# Patient Record
Sex: FEMALE | Race: WHITE | NOT HISPANIC OR LATINO | Employment: UNEMPLOYED | URBAN - METROPOLITAN AREA
[De-identification: names, ages, dates, MRNs, and addresses within clinical notes are randomized per-mention and may not be internally consistent; named-entity substitution may affect disease eponyms.]

---

## 2017-05-09 RX ORDER — CITALOPRAM 20 MG/1
TABLET ORAL
Qty: 90 TABLET | Refills: 0 | Status: SHIPPED | OUTPATIENT
Start: 2017-05-09 | End: 2017-07-20 | Stop reason: SDUPTHER

## 2017-07-20 ENCOUNTER — OFFICE VISIT (OUTPATIENT)
Dept: OBSTETRICS AND GYNECOLOGY | Age: 47
End: 2017-07-20

## 2017-07-20 VITALS
HEIGHT: 66 IN | DIASTOLIC BLOOD PRESSURE: 74 MMHG | BODY MASS INDEX: 27.97 KG/M2 | SYSTOLIC BLOOD PRESSURE: 112 MMHG | WEIGHT: 174 LBS

## 2017-07-20 DIAGNOSIS — N95.1 MENOPAUSAL SYMPTOMS: ICD-10-CM

## 2017-07-20 DIAGNOSIS — Z11.51 SCREENING FOR HUMAN PAPILLOMAVIRUS: ICD-10-CM

## 2017-07-20 DIAGNOSIS — Z12.4 ROUTINE CERVICAL SMEAR: ICD-10-CM

## 2017-07-20 DIAGNOSIS — E07.9 THYROID MASS: Primary | ICD-10-CM

## 2017-07-20 DIAGNOSIS — Z01.419 ENCOUNTER FOR GYNECOLOGICAL EXAMINATION WITHOUT ABNORMAL FINDING: ICD-10-CM

## 2017-07-20 PROCEDURE — 99396 PREV VISIT EST AGE 40-64: CPT | Performed by: OBSTETRICS & GYNECOLOGY

## 2017-07-20 RX ORDER — ACETAMINOPHEN AND CODEINE PHOSPHATE 120; 12 MG/5ML; MG/5ML
1 SOLUTION ORAL DAILY
Qty: 28 TABLET | Refills: 11 | Status: SHIPPED | OUTPATIENT
Start: 2017-07-20 | End: 2017-10-11

## 2017-07-20 RX ORDER — CITALOPRAM 20 MG/1
20 TABLET ORAL DAILY
Qty: 90 TABLET | Refills: 3 | Status: SHIPPED | OUTPATIENT
Start: 2017-07-20 | End: 2018-06-07 | Stop reason: SDUPTHER

## 2017-07-20 NOTE — PROGRESS NOTES
Subjective     Chief Complaint   Patient presents with   • Gynecologic Exam     AC       History of Present Illness    Gia Roberts is a 47 y.o.  who presents for annual exam.  Patient has had a prior endometrial ablation. Her cycles have been much lighter overall but she is still having them.  During one month she had 2 cycles in a month but prior to that they were regular.  She is noting hot flashes.  She tried a regular oral contraceptive pill but had an increase in her blood pressure.  She would like to try the mini pill again Pt felt mass in neck - TSH was normal at [place] - 1.02 patient was not happy with the workup and would like to be referred to endocrinology.  Her menses are irregular, lasting 2-3 light , dysmenorrhea none   Obstetric History:  OB History      Para Term  AB TAB SAB Ectopic Multiple Living    3 3 3       3        Obstetric Comments    Also one stepchild         Menstrual History:     Patient's last menstrual period was 07/10/2017.         Current contraception: vasectomy  History of abnormal Pap smear: yes -  positive HPV in  type 16 and 18 were negative.  Received Gardasil immunization: no  Perform regular self breast exam: no  Family history of uterine or ovarian cancer: yes - Maternal aunt  with ovarian cancer in her 40s.  Patient's BRCA test is negative  Family History of colon cancer: no  Family history of breast cancer: no    Mammogram: done today.  Colonoscopy: not indicated.  DEXA: not indicated.    Exercise: 30 mintutes cardio daily  Calcium/Vitamin D: adequate intake and uses supplements    The following portions of the patient's history were reviewed and updated as appropriate: allergies, current medications, past family history, past medical history, past social history, past surgical history and problem list.    Review of Systems    Review of Systems   Constitutional: Negative for fatigue.   Respiratory: Negative for shortness of breath.   "  Gastrointestinal: Negative for abdominal pain.   Genitourinary: Negative for dysuria.   Neurological: Negative for headaches.   Psychiatric/Behavioral: Negative for dysphoric mood.         Objective   Physical Exam    /74  Ht 66\" (167.6 cm)  Wt 174 lb (78.9 kg)  LMP 07/10/2017  BMI 28.08 kg/m2    General:   alert, appears stated age and cooperative   Neck: A possible small mass in the right lobe of the thyroid.  No adenopathy is palpated.     Heart: regular rate and rhythm   Lungs: clear to auscultation bilaterally   Abdomen: soft, non-tender, without masses or organomegaly   Breast: inspection negative, no nipple discharge or bleeding, no masses or nodularity palpable   Vulva: normal, Bartholin's, Urethra, [place]'s normal   Vagina: normal mucosa, normal discharge   Cervix: no cervical motion tenderness and no lesions   Uterus: mobile, non-tender, normal shape and consistency   Adnexa: no mass, fullness, tenderness   Rectal: normal rectal, no masses     Assessment/Plan   Gia was seen today for gynecologic exam.    Diagnoses and all orders for this visit:    Thyroid mass  -     Ambulatory Referral to Endocrinology  -     CBC & Differential  -     TSH  -     T4  -     T3, Uptake  -     T3 Uptake & FTI    Encounter for gynecological examination without abnormal finding  -     IGP, Aptima HPV, Rfx 16 / 18,45    Menopausal symptoms    Routine cervical smear  -     IGP, Aptima HPV, Rfx 16 / 18,45    Screening for human papillomavirus  -     IGP, Aptima HPV, Rfx 16 / 18,45    Other orders  -     citalopram (CeleXA) 20 MG tablet; Take 1 tablet by mouth Daily.  -     norethindrone (NOR-QD) 0.35 MG tablet; Take 1 tablet by mouth Daily.  Patient will continue on Celexa.  She did try a break but had significant depression symptoms and wished to restart.  She would also like to restart the mini pill due to hot flashes and some irregularity of her cycle.  She was told to report if she has any more months with an " irregular cycle and we will consider an endometrial biopsy.    Questionable area in the right thyroid.  We will check thyroid studies a CBC and refer the patient to endocrinology.    All questions answered.  Breast self exam technique reviewed and patient encouraged to perform self-exam monthly.  Discussed healthy lifestyle modifications.  Recommended 30 minutes of aerobic exercise five times per week.  Discussed calcium needs to prevent osteoporosis.

## 2017-07-21 ENCOUNTER — TELEPHONE (OUTPATIENT)
Dept: OBSTETRICS AND GYNECOLOGY | Age: 47
End: 2017-07-21

## 2017-07-21 LAB
BASOPHILS # BLD AUTO: 0.03 10*3/MM3 (ref 0–0.2)
BASOPHILS NFR BLD AUTO: 0.5 % (ref 0–1.5)
EOSINOPHIL # BLD AUTO: 0.07 10*3/MM3 (ref 0–0.7)
EOSINOPHIL NFR BLD AUTO: 1.1 % (ref 0.3–6.2)
ERYTHROCYTE [DISTWIDTH] IN BLOOD BY AUTOMATED COUNT: 12.2 % (ref 11.7–13)
FT4I SERPL CALC-MCNC: 1.5 (ref 1.2–4.9)
HCT VFR BLD AUTO: 42.6 % (ref 35.6–45.5)
HGB BLD-MCNC: 14.4 G/DL (ref 11.9–15.5)
IMM GRANULOCYTES # BLD: 0 10*3/MM3 (ref 0–0.03)
IMM GRANULOCYTES NFR BLD: 0 % (ref 0–0.5)
LYMPHOCYTES # BLD AUTO: 1.87 10*3/MM3 (ref 0.9–4.8)
LYMPHOCYTES NFR BLD AUTO: 30.5 % (ref 19.6–45.3)
MCH RBC QN AUTO: 31.6 PG (ref 26.9–32)
MCHC RBC AUTO-ENTMCNC: 33.8 G/DL (ref 32.4–36.3)
MCV RBC AUTO: 93.6 FL (ref 80.5–98.2)
MONOCYTES # BLD AUTO: 0.4 10*3/MM3 (ref 0.2–1.2)
MONOCYTES NFR BLD AUTO: 6.5 % (ref 5–12)
NEUTROPHILS # BLD AUTO: 3.76 10*3/MM3 (ref 1.9–8.1)
NEUTROPHILS NFR BLD AUTO: 61.4 % (ref 42.7–76)
PLATELET # BLD AUTO: 271 10*3/MM3 (ref 140–500)
RBC # BLD AUTO: 4.55 10*6/MM3 (ref 3.9–5.2)
T3RU NFR SERPL: 26 % (ref 24–39)
T4 SERPL-MCNC: 5.8 UG/DL (ref 4.5–12)
TSH SERPL DL<=0.005 MIU/L-ACNC: 2.87 MIU/ML (ref 0.27–4.2)
WBC # BLD AUTO: 6.13 10*3/MM3 (ref 4.5–10.7)

## 2017-07-26 LAB
CYTOLOGIST CVX/VAG CYTO: ABNORMAL
CYTOLOGY CVX/VAG DOC THIN PREP: ABNORMAL
DX ICD CODE: ABNORMAL
HIV 1 & 2 AB SER-IMP: ABNORMAL
HPV I/H RISK 4 DNA CVX QL PROBE+SIG AMP: POSITIVE
HPV16 DNA CVX QL PROBE+SIG AMP: NEGATIVE
HPV18+45 E6+E7 MRNA CVX QL NAA+PROBE: NEGATIVE
Lab: ABNORMAL
OTHER STN SPEC: ABNORMAL
PATH REPORT.FINAL DX SPEC: ABNORMAL
STAT OF ADQ CVX/VAG CYTO-IMP: ABNORMAL

## 2017-07-27 PROBLEM — B97.7 HPV IN FEMALE: Status: ACTIVE | Noted: 2017-07-27

## 2017-10-11 ENCOUNTER — OFFICE VISIT (OUTPATIENT)
Dept: ENDOCRINOLOGY | Age: 47
End: 2017-10-11

## 2017-10-11 VITALS
DIASTOLIC BLOOD PRESSURE: 68 MMHG | OXYGEN SATURATION: 99 % | WEIGHT: 177 LBS | HEIGHT: 66 IN | SYSTOLIC BLOOD PRESSURE: 102 MMHG | BODY MASS INDEX: 28.45 KG/M2 | HEART RATE: 71 BPM

## 2017-10-11 DIAGNOSIS — E04.1 SOLITARY THYROID NODULE: ICD-10-CM

## 2017-10-11 DIAGNOSIS — Z23 NEED FOR INFLUENZA VACCINATION: Primary | ICD-10-CM

## 2017-10-11 DIAGNOSIS — R53.82 CHRONIC FATIGUE: Primary | ICD-10-CM

## 2017-10-11 PROCEDURE — 99243 OFF/OP CNSLTJ NEW/EST LOW 30: CPT | Performed by: INTERNAL MEDICINE

## 2017-10-11 PROCEDURE — 90656 IIV3 VACC NO PRSV 0.5 ML IM: CPT | Performed by: INTERNAL MEDICINE

## 2017-10-11 PROCEDURE — 90471 IMMUNIZATION ADMIN: CPT | Performed by: INTERNAL MEDICINE

## 2017-10-11 NOTE — PROGRESS NOTES
Chief Complaint   Patient presents with   • Thyroid Problem   New Patient Appointment/ Thyroid Mass    HPI  Gia Roberts,47 y.o. WF is here as anew pt for the management of thyroid nodule. Consulted by Dr. Henderson.   Pt felt thyroid nodule on her own physical exam and reported this to Dr. Henderson. Pt didn't have the thyroid U/S.     Pt reports having symptoms for about  9 - 10 months, symptoms are stable per pt.   She complains of feeling tired, reports that she lost 5 pounds of weight but that is with extreme diet and exercise, normal bm on miralax, no hair loss, c/o increased sweating, no dry skin, sleep is ok on melatonin. c/o heat intolerance and no cold intolerance. No c/o tremors, no racing of heart does have hx of PVC and no eye symptoms.   Denied c/o difficulty breathing, no c/o difficulty in swallowing and reports some change in voice.   Family hx of thyroid disease in her mother and grand mother - Hypothyroidism.     Menstrual hx - Menarche at age 8, LMP - 10/5/17, periods are regular, , no miscarriage.     I have reviewed the patient's allergies, medicines, past medical hx, family hx and social hx in detail.    Past Medical History:   Diagnosis Date   • Anxiety    • BRCA negative    • Endometriosis    • Infertility, female    • Lump of breast, left    • Migraine    • Mood swings        Family History   Problem Relation Age of Onset   • Lung cancer Paternal Grandmother    • Lung cancer Paternal Grandfather    • Ovarian cancer Maternal Aunt 40     The patient's BRCA test is negative   • Asperger's syndrome Son    • No Known Problems Mother    • No Known Problems Father    • No Known Problems Sister    • No Known Problems Brother    • No Known Problems Daughter    • No Known Problems Maternal Grandmother    • No Known Problems Paternal Aunt    • BRCA 1/2 Neg Hx    • Breast cancer Neg Hx    • Colon cancer Neg Hx    • Endometrial cancer Neg Hx        Social History     Social History   • Marital status:  "     Spouse name: N/A   • Number of children: N/A   • Years of education: N/A     Occupational History   • Homemaker      Social History Main Topics   • Smoking status: Never Smoker   • Smokeless tobacco: Not on file   • Alcohol use Not on file      Comment: rare   • Drug use: No   • Sexual activity: Yes     Partners: Male     Birth control/ protection: Surgical     Other Topics Concern   • Not on file     Social History Narrative       Allergies   Allergen Reactions   • Epinephrine    • Hydrocodone    • Oxycodone          Current Outpatient Prescriptions:   •  citalopram (CeleXA) 20 MG tablet, Take 1 tablet by mouth Daily. (Patient taking differently: Take 10 mg by mouth Daily.), Disp: 90 tablet, Rfl: 3  •  Melatonin 1 MG capsule, Take  by mouth., Disp: , Rfl:      Review of Systems   Constitutional: Negative for fever.   HENT: Positive for voice change. Negative for facial swelling, nosebleeds and trouble swallowing.    Eyes: Negative for pain and redness.   Respiratory: Negative for shortness of breath and wheezing.    Cardiovascular: Negative for palpitations and leg swelling.   Gastrointestinal: Negative for abdominal pain, diarrhea and vomiting.   Endocrine: Positive for polydipsia. Negative for polyuria.   Genitourinary: Negative for decreased urine volume and frequency.   Musculoskeletal: Negative for joint swelling and neck pain.   Skin: Negative for rash.   Allergic/Immunologic: Negative for immunocompromised state.   Neurological: Negative for seizures and facial asymmetry.   Hematological: Bruises/bleeds easily.   Psychiatric/Behavioral: Negative for agitation and confusion.       Objective:    /68  Pulse 71  Ht 66\" (167.6 cm)  Wt 177 lb (80.3 kg)  SpO2 99%  BMI 28.57 kg/m2    Physical Exam   Constitutional: She is oriented to person, place, and time. She appears well-nourished.   HENT:   Head: Normocephalic and atraumatic.   Eyes: Conjunctivae and EOM are normal. No scleral icterus. "   Neck: Normal range of motion. Neck supple. No thyromegaly present.   Thyroid nodule on the left lobe palapble   Cardiovascular: Normal rate and normal heart sounds.  Exam reveals no friction rub.    No murmur heard.  HR - 71   Pulmonary/Chest: Effort normal and breath sounds normal. No stridor. She has no wheezes. She has no rales.   Abdominal: Soft. Bowel sounds are normal. She exhibits no distension. There is no tenderness.   Musculoskeletal: She exhibits no edema or tenderness.   Lymphadenopathy:     She has no cervical adenopathy.   Neurological: She is alert and oriented to person, place, and time.   No tremors and normal reflexes   Skin: Skin is warm and dry. She is not diaphoretic.   Psychiatric: She has a normal mood and affect.   Vitals reviewed.      Results Review:    I reviewed the patient's new clinical results.    Office Visit on 07/20/2017   Component Date Value Ref Range Status   • WBC 07/20/2017 6.13  4.50 - 10.70 10*3/mm3 Final   • RBC 07/20/2017 4.55  3.90 - 5.20 10*6/mm3 Final   • Hemoglobin 07/20/2017 14.4  11.9 - 15.5 g/dL Final   • Hematocrit 07/20/2017 42.6  35.6 - 45.5 % Final   • MCV 07/20/2017 93.6  80.5 - 98.2 fL Final   • MCH 07/20/2017 31.6  26.9 - 32.0 pg Final   • MCHC 07/20/2017 33.8  32.4 - 36.3 g/dL Final   • RDW 07/20/2017 12.2  11.7 - 13.0 % Final   • Platelets 07/20/2017 271  140 - 500 10*3/mm3 Final   • Neutrophil Rel % 07/20/2017 61.4  42.7 - 76.0 % Final   • Lymphocyte Rel % 07/20/2017 30.5  19.6 - 45.3 % Final   • Monocyte Rel % 07/20/2017 6.5  5.0 - 12.0 % Final   • Eosinophil Rel % 07/20/2017 1.1  0.3 - 6.2 % Final   • Basophil Rel % 07/20/2017 0.5  0.0 - 1.5 % Final   • Neutrophils Absolute 07/20/2017 3.76  1.90 - 8.10 10*3/mm3 Final   • Lymphocytes Absolute 07/20/2017 1.87  0.90 - 4.80 10*3/mm3 Final   • Monocytes Absolute 07/20/2017 0.40  0.20 - 1.20 10*3/mm3 Final   • Eosinophils Absolute 07/20/2017 0.07  0.00 - 0.70 10*3/mm3 Final   • Basophils Absolute 07/20/2017  0.03  0.00 - 0.20 10*3/mm3 Final   • Immature Granulocyte Rel % 07/20/2017 0.0  0.0 - 0.5 % Final   • Immature Grans Absolute 07/20/2017 0.00  0.00 - 0.03 10*3/mm3 Final   • TSH 07/20/2017 2.870  0.270 - 4.200 mIU/mL Final   • T4, Total 07/20/2017 5.8  4.5 - 12.0 ug/dL Final   • T3 Uptake 07/20/2017 26  24 - 39 % Final   • Free Thyroxine Index 07/20/2017 1.5  1.2 - 4.9 Final   • Diagnosis 07/20/2017 Comment   Final    Comment: NEGATIVE FOR INTRAEPITHELIAL LESION AND MALIGNANCY.  THIS SPECIMEN WAS RESCREENED AS PART OF OUR  PROGRAM.     • Specimen adequacy: 07/20/2017 Comment   Final    Comment: Satisfactory for evaluation.  Endocervical and/or squamous metaplastic  cells (endocervical component) are present.     • Clinician Provided ICD-10: 07/20/2017 Comment   Final    Comment: Z01.419  Z12.4  Z11.51     • Performed by: 07/20/2017 Comment   Final    Faith Okeefe, Cytotechnologist ([facility])   • QC reviewed by: 07/20/2017 Comment   Final    Isabell Gleason, Supervisory Cytotechnologist ([facility])   • . 07/20/2017 .   Final   • Note: 07/20/2017 Comment   Final    Comment: The Pap smear is a screening test designed to aid in the detection of  premalignant and malignant conditions of the uterine cervix.  It is not a  diagnostic procedure and should not be used as the sole means of detecting  cervical cancer.  Both false-positive and false-negative reports do occur.     • Method: 07/20/2017 Comment   Final    Comment: This liquid based ThinPrep(R) pap test was screened with the  use of an image guided system.     • HPV Aptima 07/20/2017 Positive* Negative Final    Comment: This test detects fourteen high-risk HPV types (16/18/31/33/35/39/45/  51/52/56/58/59/66/68) without differentiation.     • HPV Genotype, 16 07/20/2017 Negative  Negative Final   • HPV Genotype 18,45 07/20/2017 Negative  Negative Final       Gia was seen today for thyroid problem.    Diagnoses and all orders for this  visit:    Chronic fatigue  -     TSH  -     T4, Free  -     Thyroid Peroxidase Antibody  -     Hemoglobin A1c  -     Lipid Panel  -     Vitamin B12 & Folate  -     Vitamin D 25 Hydroxy  -     TSH; Future  -     T4, Free; Future  -     Hemoglobin A1c; Future  -     Basic Metabolic Panel; Future  -     Lipid Panel; Future  -     Vitamin B12 & Folate; Future  -     Vitamin D 25 Hydroxy; Future    Solitary thyroid nodule  -     TSH  -     T4, Free  -     Thyroid Peroxidase Antibody  -     Hemoglobin A1c  -     Lipid Panel  -     Vitamin B12 & Folate  -     Vitamin D 25 Hydroxy  -     TSH; Future  -     T4, Free; Future  -     Hemoglobin A1c; Future  -     Basic Metabolic Panel; Future  -     Lipid Panel; Future  -     Vitamin B12 & Folate; Future  -     Vitamin D 25 Hydroxy; Future    Solitary thyroid nodule  We will perform thyroid ultrasound.    Chronic fatigue  Will check TSH, free T4, TPO antibody    Based on patient's age we will perform HbA1c, vitamin B12 and vitamin D 25-hydroxy levels.    Thank you for asking me to see your patient Gia Roberts in consultation.        Helio Pinto MD  10/11/17

## 2017-10-12 LAB
25(OH)D3+25(OH)D2 SERPL-MCNC: 43.6 NG/ML (ref 30–100)
CHOLEST SERPL-MCNC: 191 MG/DL (ref 0–200)
FOLATE SERPL-MCNC: 15.33 NG/ML (ref 4.78–24.2)
HBA1C MFR BLD: 4.56 % (ref 4.8–5.6)
HDLC SERPL-MCNC: 74 MG/DL (ref 40–60)
LDLC SERPL CALC-MCNC: 96 MG/DL (ref 0–100)
T4 FREE SERPL-MCNC: 1.06 NG/DL (ref 0.93–1.7)
THYROPEROXIDASE AB SERPL-ACNC: 421 IU/ML (ref 0–34)
TRIGL SERPL-MCNC: 107 MG/DL (ref 0–150)
TSH SERPL DL<=0.005 MIU/L-ACNC: 2.04 MIU/ML (ref 0.27–4.2)
VIT B12 SERPL-MCNC: 433 PG/ML (ref 211–946)
VLDLC SERPL CALC-MCNC: 21.4 MG/DL (ref 5–40)

## 2017-10-12 NOTE — PROGRESS NOTES
Mail results to pt, no treatment changes at this time. Thyroid levels are normal, TPO ab is positive but it didn't affect her thyroid function. Hba1c is normal and her Lipid panel is great with HDL good cholesterol being high. Keep up the good work.

## 2017-10-24 ENCOUNTER — HOSPITAL ENCOUNTER (OUTPATIENT)
Dept: ULTRASOUND IMAGING | Facility: HOSPITAL | Age: 47
Discharge: HOME | End: 2017-10-24
Attending: INTERNAL MEDICINE | Admitting: INTERNAL MEDICINE

## 2017-10-24 DIAGNOSIS — E04.1 SOLITARY THYROID NODULE: ICD-10-CM

## 2017-10-24 DIAGNOSIS — R53.82 CHRONIC FATIGUE: ICD-10-CM

## 2017-10-24 PROCEDURE — 76536 US EXAM OF HEAD AND NECK: CPT

## 2017-11-15 ENCOUNTER — OFFICE VISIT (OUTPATIENT)
Dept: ENDOCRINOLOGY | Age: 47
End: 2017-11-15

## 2017-11-15 DIAGNOSIS — E04.2 NONTOXIC MULTINODULAR GOITER: ICD-10-CM

## 2017-11-15 PROCEDURE — 10022 PR FINE NEEDLE ASP;W/IMAGING GUIDANCE: CPT | Performed by: INTERNAL MEDICINE

## 2017-11-15 PROCEDURE — 76942 ECHO GUIDE FOR BIOPSY: CPT | Performed by: INTERNAL MEDICINE

## 2017-11-15 NOTE — PROGRESS NOTES
US Thyroid FNA :     Indication: Ultrasound guidance for fine needle aspiration biopsy based on thyroid U/S findings.     Consent: The procedure, with its potential risks and complications (including, but not limited to airway compromise, infection, bleeding), was discussed with the patient, including the option of not performing the procedure. Verbal and written consent was obtained.     Time-out:  A time-out was observed to confirm the correct patient, procedure, and site.    Localization: With the patient in supine position, the target nodule was identified by ultrasound. A skin site was marked, and the skin was prepped and draped in usual sterile fashion. 27 gauge needle is used and 3 passes have been made in the nodule.   FNA with ultrasound guidance was performed on - right thyroid nodule - 1.4 cm.   Samples have been to cytopathology.   Sterile dressing placed.  May place ice pack over affected area during the first 24 hours.    Condition :   Stable    Complications : None    Discussed with the patient that we will relate the results to the patient as soon as the pathology results are available.  In case if the results are indeterminant will send the samples to [facility] for further analysis.     Impression :Uncomplicated fine needle aspiration biopsy of thyroid nodule under ultrasound guidance.

## 2017-12-01 ENCOUNTER — TELEPHONE (OUTPATIENT)
Dept: ENDOCRINOLOGY | Age: 47
End: 2017-12-01

## 2017-12-01 NOTE — TELEPHONE ENCOUNTER
----- Message from Helio Pinto MD sent at 11/17/2017  2:23 PM EST -----  Please request them for molecular analysis.   Thanks.     ----- Message -----     From: Ashley Huynh     Sent: 11/17/2017   1:57 PM       To: Helio Pinto MD        Spoke with patient about FNA results

## 2017-12-13 ENCOUNTER — TELEPHONE (OUTPATIENT)
Dept: ENDOCRINOLOGY | Age: 47
End: 2017-12-13

## 2017-12-13 NOTE — TELEPHONE ENCOUNTER
Patient called office stating that she was having some issues with hand  And wrist pain and knee pain heat and cold intolarance Wanted to see if she can start a low dose if thyroid medication.    Pre Dr Pinto   We dont generally give thyroid medication for TPO being high especially when their TSH and free T 4 are normal. She stated  The patient should see her PCP for the issues she is having. If patient he really wanting to do medication. We will try Levothyroxine 25 mcg but let patient know that she would increase her chances for cardiac risk.     Spoke with patient about medication and  Cardiac risk factors that will go with the medication.    Patient understood about the cardiac risk if she started the medication and decides to let her OB/GYN do blood test since she did not like her PCP at this time. Patient will let us know on the blood work. Patient did not want to start medication due to the cardiac risk

## 2018-03-28 ENCOUNTER — LAB (OUTPATIENT)
Dept: ENDOCRINOLOGY | Age: 48
End: 2018-03-28

## 2018-03-28 DIAGNOSIS — E04.1 SOLITARY THYROID NODULE: ICD-10-CM

## 2018-03-28 DIAGNOSIS — R53.82 CHRONIC FATIGUE: ICD-10-CM

## 2018-03-29 LAB
25(OH)D3+25(OH)D2 SERPL-MCNC: 34.1 NG/ML (ref 30–100)
BUN SERPL-MCNC: 15 MG/DL (ref 6–20)
BUN/CREAT SERPL: 18.3 (ref 7–25)
CALCIUM SERPL-MCNC: 9.7 MG/DL (ref 8.6–10.5)
CHLORIDE SERPL-SCNC: 103 MMOL/L (ref 98–107)
CHOLEST SERPL-MCNC: 213 MG/DL (ref 0–200)
CO2 SERPL-SCNC: 28.2 MMOL/L (ref 22–29)
CREAT SERPL-MCNC: 0.82 MG/DL (ref 0.57–1)
FOLATE SERPL-MCNC: >20 NG/ML (ref 4.78–24.2)
GFR SERPLBLD CREATININE-BSD FMLA CKD-EPI: 74 ML/MIN/1.73
GFR SERPLBLD CREATININE-BSD FMLA CKD-EPI: 90 ML/MIN/1.73
GLUCOSE SERPL-MCNC: 89 MG/DL (ref 65–99)
HBA1C MFR BLD: 4.65 % (ref 4.8–5.6)
HDLC SERPL-MCNC: 81 MG/DL (ref 40–60)
INTERPRETATION: NORMAL
LDLC SERPL CALC-MCNC: 115 MG/DL (ref 0–100)
Lab: NORMAL
POTASSIUM SERPL-SCNC: 4.6 MMOL/L (ref 3.5–5.2)
SODIUM SERPL-SCNC: 144 MMOL/L (ref 136–145)
T4 FREE SERPL-MCNC: 0.99 NG/DL (ref 0.93–1.7)
TRIGL SERPL-MCNC: 84 MG/DL (ref 0–150)
TSH SERPL DL<=0.005 MIU/L-ACNC: 2.44 MIU/ML (ref 0.27–4.2)
VIT B12 SERPL-MCNC: 446 PG/ML (ref 211–946)
VLDLC SERPL CALC-MCNC: 16.8 MG/DL (ref 5–40)

## 2018-04-16 ENCOUNTER — OFFICE VISIT (OUTPATIENT)
Dept: ENDOCRINOLOGY | Age: 48
End: 2018-04-16

## 2018-04-16 VITALS
OXYGEN SATURATION: 98 % | SYSTOLIC BLOOD PRESSURE: 114 MMHG | DIASTOLIC BLOOD PRESSURE: 72 MMHG | HEART RATE: 76 BPM | BODY MASS INDEX: 30.38 KG/M2 | WEIGHT: 188.2 LBS

## 2018-04-16 DIAGNOSIS — E04.1 SOLITARY THYROID NODULE: ICD-10-CM

## 2018-04-16 DIAGNOSIS — R53.82 CHRONIC FATIGUE: Primary | ICD-10-CM

## 2018-04-16 DIAGNOSIS — R63.5 WEIGHT GAIN: ICD-10-CM

## 2018-04-16 PROCEDURE — 99214 OFFICE O/P EST MOD 30 MIN: CPT | Performed by: INTERNAL MEDICINE

## 2018-04-16 NOTE — PROGRESS NOTES
47 y.o.    Patient Care Team:  Guru Breaux MD as PCP - General (Family Medicine)    Chief Complaint:    6 MONTH FOLLOW UP/THYROID NODULE  Subjective     HPI    Gia Roberts,47 y.o. WF is here as a follow up pt for the management of thyroid nodule, chronic fatigue and weight gain. Consulted by Dr. Henderson.     Patient underwent biopsy of her thyroid nodule which the pathology was indeterminant but the molecular analysis showed benign pathology.     Patient has contacted us multiple times with the symptoms of chronic fatigue and has been requesting this for replacement of levothyroxin given to her TSH, free T4 levels were within normal limits.  Today in the clinic she was asking me as to why her T3 levels were not checked even though her TSH, free T4 levels have been normal I'll along.  Patient does have to TPO Antibody positive but her thyroid levels are within the normal range.    She is also extremely concerned about her weight gain, gained about 10 pounds since her last visit which was 6 months ago, normal bowel movements on stool softener, no hair loss, no increased sweating, sleep is okay on melatonin.  Does have complaints of heat intolerance.  No hyperthyroid symptoms.  Denied c/o difficulty breathing, no c/o difficulty in swallowing and reports some change in voice.   Family hx of thyroid disease in her mother and grand mother - Hypothyroidism.      Menstrual hx - Menarche at age 8, she has 3 kids of her own, no miscarriages, she does report that her periods are getting irregular since her last visit and she has been having periods once every 3 months.  Interval History      The following portions of the patient's history were reviewed and updated as appropriate: allergies, current medications, past family history, past medical history, past social history, past surgical history and problem list.    Past Medical History:   Diagnosis Date   • Anxiety    • BRCA negative    • Endometriosis    •  Infertility, female    • Lump of breast, left    • Migraine    • Mood swings      Family History   Problem Relation Age of Onset   • Lung cancer Paternal Grandmother    • Lung cancer Paternal Grandfather    • Ovarian cancer Maternal Aunt 40     The patient's BRCA test is negative   • Asperger's syndrome Son    • No Known Problems Mother    • No Known Problems Father    • No Known Problems Sister    • No Known Problems Brother    • No Known Problems Daughter    • No Known Problems Maternal Grandmother    • No Known Problems Paternal Aunt    • BRCA 1/2 Neg Hx    • Breast cancer Neg Hx    • Colon cancer Neg Hx    • Endometrial cancer Neg Hx      Social History     Social History   • Marital status:      Spouse name: N/A   • Number of children: N/A   • Years of education: N/A     Occupational History   • Homemaker      Social History Main Topics   • Smoking status: Never Smoker   • Smokeless tobacco: Not on file   • Alcohol use Not on file      Comment: rare   • Drug use: No   • Sexual activity: Yes     Partners: Male     Birth control/ protection: Surgical     Other Topics Concern   • Not on file     Social History Narrative   • No narrative on file     Allergies   Allergen Reactions   • Epinephrine    • Hydrocodone    • Oxycodone        Current Outpatient Prescriptions:   •  citalopram (CeleXA) 20 MG tablet, Take 1 tablet by mouth Daily. (Patient taking differently: Take 10 mg by mouth Daily.), Disp: 90 tablet, Rfl: 3  •  Melatonin 1 MG capsule, Take  by mouth., Disp: , Rfl:         Review of Systems   Constitutional: Positive for fatigue. Negative for fever.   HENT: Positive for voice change. Negative for facial swelling, nosebleeds and trouble swallowing.    Eyes: Negative for pain and redness.   Respiratory: Positive for wheezing. Negative for shortness of breath.    Cardiovascular: Negative for palpitations and leg swelling.   Gastrointestinal: Negative for abdominal pain, diarrhea and vomiting.    Endocrine: Positive for polydipsia. Negative for polyuria.   Genitourinary: Negative for decreased urine volume and frequency.   Musculoskeletal: Positive for joint swelling. Negative for neck pain.   Skin: Negative for rash.   Allergic/Immunologic: Negative for immunocompromised state.   Neurological: Negative for seizures and facial asymmetry.   Hematological: Bruises/bleeds easily.   Psychiatric/Behavioral: Negative for agitation and confusion.       Objective       Vitals:    04/16/18 1255   BP: 114/72   Pulse: 76   SpO2: 98%   Weight: 85.4 kg (188 lb 3.2 oz)     Body mass index is 30.38 kg/m².      Physical Exam   Gen exam - alert and oriented x 3, obese female, not in distress.   HEENT - No acanthosis nigricans. Thyroid palpable.   Resp - Clear to auscultation.   CVS - S1,S2 heard and no murmurs.   Abd - Non tender, BS heard.   Ext - No edema and intact pin prick and proprioception.     Results Review:     I reviewed the patient's new clinical results.    Medical records reviewed  Summary: done      Lab on 03/28/2018   Component Date Value Ref Range Status   • TSH 03/29/2018 2.440  0.270 - 4.200 mIU/mL Final   • Free T4 03/29/2018 0.99  0.93 - 1.70 ng/dL Final   • Hemoglobin A1C 03/29/2018 4.65* 4.80 - 5.60 % Final    Comment: Hemoglobin A1C Ranges:  Increased Risk for Diabetes  5.7% to 6.4%  Diabetes                     >= 6.5%  Diabetic Goal                < 7.0%     • Glucose 03/29/2018 89  65 - 99 mg/dL Final   • BUN 03/29/2018 15  6 - 20 mg/dL Final   • Creatinine 03/29/2018 0.82  0.57 - 1.00 mg/dL Final   • eGFR Non African Am 03/29/2018 74  >60 mL/min/1.73 Final   • eGFR African Am 03/29/2018 90  >60 mL/min/1.73 Final   • BUN/Creatinine Ratio 03/29/2018 18.3  7.0 - 25.0 Final   • Sodium 03/29/2018 144  136 - 145 mmol/L Final   • Potassium 03/29/2018 4.6  3.5 - 5.2 mmol/L Final   • Chloride 03/29/2018 103  98 - 107 mmol/L Final   • Total CO2 03/29/2018 28.2  22.0 - 29.0 mmol/L Final   • Calcium  03/29/2018 9.7  8.6 - 10.5 mg/dL Final   • Total Cholesterol 03/29/2018 213* 0 - 200 mg/dL Final   • Triglycerides 03/29/2018 84  0 - 150 mg/dL Final   • HDL Cholesterol 03/29/2018 81* 40 - 60 mg/dL Final   • VLDL Cholesterol 03/29/2018 16.8  5 - 40 mg/dL Final   • LDL Cholesterol  03/29/2018 115* 0 - 100 mg/dL Final   • Vitamin B-12 03/29/2018 446  211 - 946 pg/mL Final   • Folate 03/29/2018 >20.00  4.78 - 24.20 ng/mL Final   • 25 Hydroxy, Vitamin D 03/29/2018 34.1  30.0 - 100.0 ng/ml Final    Comment: Reference Range for Total Vitamin D 25(OH)  Deficiency    <20.0 ng/mL  Insufficiency 21-29 ng/mL  Sufficiency    ng/mL  Toxicity      >100 ng/ml        • Interpretation 03/29/2018 Note   Final   • PDF Image 03/29/2018 Not applicable   Final     Lab Results   Component Value Date    HGBA1C 4.65 (L) 03/28/2018    HGBA1C 4.56 (L) 10/11/2017     Lab Results   Component Value Date    CREATININE 0.82 03/28/2018     Imaging Results (most recent)     None                Assessment and Plan:    Gia was seen today for thyroid problem.    Diagnoses and all orders for this visit:    Chronic fatigue  -     ACTH; Future  -     Cortisol - AM; Future    Solitary thyroid nodule    Weight gain    Chronic fatigue  Reassured the patient that her thyroid levels are within normal limits and that it is not contributing for her symptoms of fatigue.  Explained to the patient that we go by TSH, free T4 for the management of hypothyroidism and we do not check T3 levels in this scenario.  Will check morning cortisol levels, ACTH levels to rule out hormonal causes for fatigue and weight gain.    Weight gain  Discussed with the patient about referring her to metabolic program and she refused.  Discussed about various weight loss medications that she is extremely concerned about the risks of these medications.  Counseled her on calorie counting-1100-1200 valerie.    Solitary thyroid nodule  Thyroid nodule biopsy in 2017 was benign with the  molecular analysis.  Will recommend continuing the follow up for thyroid nodules with serial thyroid ultrasound's at least every      Reviewed Lab results with the patient.       The total face to face time spent 25minutes with the patient,13minutes (greater than 50% of the total time) was spent counseling and coordination of the care on current clinical conditions and treatment plan.

## 2018-04-23 ENCOUNTER — RESULTS ENCOUNTER (OUTPATIENT)
Dept: ENDOCRINOLOGY | Age: 48
End: 2018-04-23

## 2018-04-23 DIAGNOSIS — R53.82 CHRONIC FATIGUE: ICD-10-CM

## 2018-06-07 RX ORDER — CITALOPRAM 20 MG/1
TABLET ORAL
Qty: 90 TABLET | Refills: 0 | Status: SHIPPED | OUTPATIENT
Start: 2018-06-07 | End: 2018-09-05 | Stop reason: SDUPTHER

## 2018-08-27 ENCOUNTER — OFFICE VISIT (OUTPATIENT)
Dept: OBSTETRICS AND GYNECOLOGY | Age: 48
End: 2018-08-27

## 2018-08-27 VITALS
WEIGHT: 179 LBS | DIASTOLIC BLOOD PRESSURE: 78 MMHG | SYSTOLIC BLOOD PRESSURE: 120 MMHG | HEIGHT: 66 IN | BODY MASS INDEX: 28.77 KG/M2

## 2018-08-27 DIAGNOSIS — Z01.419 ENCOUNTER FOR GYNECOLOGICAL EXAMINATION WITHOUT ABNORMAL FINDING: Primary | ICD-10-CM

## 2018-08-27 DIAGNOSIS — Z11.51 SPECIAL SCREENING EXAMINATION FOR HUMAN PAPILLOMAVIRUS (HPV): ICD-10-CM

## 2018-08-27 DIAGNOSIS — Z12.4 ROUTINE CERVICAL SMEAR: ICD-10-CM

## 2018-08-27 PROCEDURE — 99396 PREV VISIT EST AGE 40-64: CPT | Performed by: OBSTETRICS & GYNECOLOGY

## 2018-08-27 RX ORDER — ALBUTEROL SULFATE 90 UG/1
2 AEROSOL, METERED RESPIRATORY (INHALATION)
COMMUNITY

## 2018-08-27 NOTE — PROGRESS NOTES
Subjective     Chief Complaint   Patient presents with   • Gynecologic Exam     AC       History of Present Illness    Gia Roberts is a 48 y.o.  who presents for annual exam.  The patient has had a previous endometrial ablation.  She does have hot flashes and some irregularity with 2 menses a month.  She started on the mini pill for a while. She had eleveated BP and stopped. She started progesterone cream for 2 weeks of the cycle.  And cycles are now regular but light.  She saw endo hashimoto's with normal labs. arthritis in hands.  She is trying to exercise but the arthritis limits her.   Her menses are regular every 28-30 days, lasting 0-3 days,light  dysmenorrhea none   Obstetric History:  OB History      Para Term  AB Living    3 3 3     3    SAB TAB Ectopic Molar Multiple Live Births              3        Obstetric Comments    Also one stepchild         Menstrual History:     Patient's last menstrual period was 2018.         Current contraception: vasectomy  History of abnormal Pap smear: HPV  and 2017, 16,18 neg  Received Gardasil immunization: no  Perform regular self breast exam: yes  Family history of uterine or ovarian cancer: yes - Maternal aunt had ovarian cancer at age 40.  The patient has previously had BRCA testing which was negative. testing was done through [facility].  Family History of colon cancer: no  Family history of breast cancer: no    Mammogram: ordered.  Colonoscopy: recommended.  DEXA: not indicated.    Exercise: walks and swims 3 times a week.   Calcium/Vitamin D: adequate intake    The following portions of the patient's history were reviewed and updated as appropriate: allergies, current medications, past family history, past medical history, past social history, past surgical history and problem list.    Review of Systems    Review of Systems   Constitutional: Negative for fatigue.   Respiratory: Negative for shortness of breath.    Gastrointestinal:  "Negative for abdominal pain.   Genitourinary: Negative for dysuria.   Neurological: Negative for headaches.   Psychiatric/Behavioral: Negative for dysphoric mood.          Objective   Physical Exam    /78   Ht 167.6 cm (66\")   Wt 81.2 kg (179 lb)   LMP 07/24/2018   BMI 28.89 kg/m²     General:   alert, appears stated age and cooperative   Neck: thyroid normal to palpation   Heart: regular rate and rhythm   Lungs: clear to auscultation bilaterally   Abdomen: soft, non-tender, without masses or organomegaly   Breast: inspection negative, no nipple discharge or bleeding, no masses or nodularity palpable   Vulva: normal, Bartholin's, Urethra, [place]'s normal   Vagina: normal mucosa, normal discharge   Cervix: no cervical motion tenderness and no lesions   Uterus: mobile, non-tender, normal shape and consistency   Adnexa: no mass, fullness, tenderness   Rectal: normal rectal, no masses     Assessment/Plan   Gia was seen today for gynecologic exam.    Diagnoses and all orders for this visit:    Encounter for gynecological examination without abnormal finding  -     Ambulatory Referral For Screening Colonoscopy  -     IGP, Aptima HPV, Rfx 16 / 18,45    Routine cervical smear  -     IGP, Aptima HPV, Rfx 16 / 18,45    Special screening examination for human papillomavirus (HPV)  -     IGP, Aptima HPV, Rfx 16 / 18,45      Patient will be referred for colonoscopy.  She did have positive HPV last year so if it is still positive at the recommend colposcopy.  She will continue progesterone cream.  We discussed what to expect with menopause.  All questions answered.  Breast self exam technique reviewed and patient encouraged to perform self-exam monthly.  Discussed healthy lifestyle modifications.  Recommended 30 minutes of aerobic exercise five times per week.  Discussed calcium needs to prevent osteoporosis.                 "

## 2018-09-05 RX ORDER — CITALOPRAM 20 MG/1
TABLET ORAL
Qty: 90 TABLET | Refills: 0 | Status: SHIPPED | OUTPATIENT
Start: 2018-09-05 | End: 2018-12-04 | Stop reason: SDUPTHER

## 2018-09-10 ENCOUNTER — PROCEDURE VISIT (OUTPATIENT)
Dept: OBSTETRICS AND GYNECOLOGY | Age: 48
End: 2018-09-10

## 2018-09-10 VITALS
SYSTOLIC BLOOD PRESSURE: 108 MMHG | WEIGHT: 180 LBS | HEIGHT: 66 IN | BODY MASS INDEX: 28.93 KG/M2 | DIASTOLIC BLOOD PRESSURE: 78 MMHG

## 2018-09-10 DIAGNOSIS — B97.7 HPV IN FEMALE: ICD-10-CM

## 2018-09-10 DIAGNOSIS — Z32.00 ENCOUNTER FOR PREGNANCY TEST, RESULT UNKNOWN: Primary | ICD-10-CM

## 2018-09-10 LAB
B-HCG UR QL: NEGATIVE
INTERNAL NEGATIVE CONTROL: NEGATIVE
INTERNAL POSITIVE CONTROL: POSITIVE
Lab: NORMAL

## 2018-09-10 PROCEDURE — 81025 URINE PREGNANCY TEST: CPT | Performed by: OBSTETRICS & GYNECOLOGY

## 2018-09-10 PROCEDURE — 57454 BX/CURETT OF CERVIX W/SCOPE: CPT | Performed by: OBSTETRICS & GYNECOLOGY

## 2018-09-10 NOTE — PROGRESS NOTES
Colposcopy    Date of procedure:  9/10/2018   Risks and benefits discussed? yes   All questions answered? yes   Consents given by: patient       Pre-op indication: Positive HPV for the past 3 years Type 16,18,45 are neg. and had biopsies 2 years ago.            Procedure documentation:  The cervix was initially viewed colposcopically through a green filter.  The cervix was next bathed in acetic acid.   The findings were as follows:    Transformation zone seen? Yes but the transformation zone appears tucked into a very small cervical os.  Patient has had prior C-sections.  The cervix was dilated to facilitate placement of the endocervical curette    Findings: 1. No visible lesions  2. No mosaicism  3. No punctation  4. No abnormal vasculature   Ectocervical biopsies: not obtained.   Endocervical curettage: performed       OBGyn Exam        Colposcopic Impression: 1. Normal appearing cervix w/o visible lesion  2. Adequate colposcopy  3. Colposcopic findings are consistent with cytology    4.   The patient tolerated the procedure well      Plan: Will base further treatment on pathology results  Post biopsy instructions given to patient.  Specimens labelled and sent to pathology.

## 2018-09-12 LAB
DX ICD CODE: NORMAL
DX ICD CODE: NORMAL
PATH REPORT.FINAL DX SPEC: NORMAL
PATH REPORT.GROSS SPEC: NORMAL
PATH REPORT.RELEVANT HX SPEC: NORMAL
PATH REPORT.SITE OF ORIGIN SPEC: NORMAL
PATHOLOGIST NAME: NORMAL
PAYMENT PROCEDURE: NORMAL

## 2018-09-13 ENCOUNTER — TELEPHONE (OUTPATIENT)
Dept: OBSTETRICS AND GYNECOLOGY | Age: 48
End: 2018-09-13

## 2018-09-13 NOTE — TELEPHONE ENCOUNTER
----- Message from Milo Henderson MD sent at 9/12/2018  3:41 PM EDT -----  Please notify tissue shows no dysplasia, inflamation only is seen. Repeat pap in one year.

## 2018-12-04 RX ORDER — CITALOPRAM 20 MG/1
TABLET ORAL
Qty: 90 TABLET | Refills: 0 | Status: SHIPPED | OUTPATIENT
Start: 2018-12-04 | End: 2019-03-05 | Stop reason: SDUPTHER

## 2019-02-12 ENCOUNTER — PROCEDURE VISIT (OUTPATIENT)
Dept: OBSTETRICS AND GYNECOLOGY | Age: 49
End: 2019-02-12

## 2019-02-12 ENCOUNTER — APPOINTMENT (OUTPATIENT)
Dept: WOMENS IMAGING | Facility: HOSPITAL | Age: 49
End: 2019-02-12

## 2019-02-12 DIAGNOSIS — Z12.31 VISIT FOR SCREENING MAMMOGRAM: Primary | ICD-10-CM

## 2019-02-12 PROCEDURE — 77067 SCR MAMMO BI INCL CAD: CPT | Performed by: OBSTETRICS & GYNECOLOGY

## 2019-02-12 PROCEDURE — 77067 SCR MAMMO BI INCL CAD: CPT | Performed by: RADIOLOGY

## 2019-03-05 RX ORDER — CITALOPRAM 20 MG/1
TABLET ORAL
Qty: 90 TABLET | Refills: 2 | Status: SHIPPED | OUTPATIENT
Start: 2019-03-05 | End: 2020-03-23

## 2020-02-05 PROBLEM — M47.816 LUMBAR SPONDYLOSIS: Status: ACTIVE | Noted: 2020-02-05

## 2020-02-05 PROBLEM — IMO0002 DISC HERNIATION: Status: ACTIVE | Noted: 2020-02-05

## 2020-02-06 ENCOUNTER — TELEPHONE (OUTPATIENT)
Dept: OBSTETRICS AND GYNECOLOGY | Age: 50
End: 2020-02-06

## 2020-03-19 ENCOUNTER — TELEPHONE (OUTPATIENT)
Dept: OBSTETRICS AND GYNECOLOGY | Age: 50
End: 2020-03-19

## 2020-03-23 RX ORDER — CITALOPRAM 20 MG/1
TABLET ORAL
Qty: 90 TABLET | Refills: 2 | Status: SHIPPED | OUTPATIENT
Start: 2020-03-23 | End: 2021-04-02

## 2020-08-20 ENCOUNTER — OFFICE VISIT (OUTPATIENT)
Dept: OBSTETRICS AND GYNECOLOGY | Age: 50
End: 2020-08-20

## 2020-08-20 VITALS
WEIGHT: 186 LBS | HEIGHT: 66 IN | DIASTOLIC BLOOD PRESSURE: 80 MMHG | BODY MASS INDEX: 29.89 KG/M2 | SYSTOLIC BLOOD PRESSURE: 110 MMHG

## 2020-08-20 DIAGNOSIS — Z01.419 ENCOUNTER FOR GYNECOLOGICAL EXAMINATION WITHOUT ABNORMAL FINDING: ICD-10-CM

## 2020-08-20 DIAGNOSIS — N95.1 MENOPAUSAL SYMPTOMS: Primary | ICD-10-CM

## 2020-08-20 DIAGNOSIS — Z11.51 SPECIAL SCREENING EXAMINATION FOR HUMAN PAPILLOMAVIRUS (HPV): ICD-10-CM

## 2020-08-20 DIAGNOSIS — Z12.4 SCREENING FOR MALIGNANT NEOPLASM OF THE CERVIX: ICD-10-CM

## 2020-08-20 PROCEDURE — 99396 PREV VISIT EST AGE 40-64: CPT | Performed by: OBSTETRICS & GYNECOLOGY

## 2020-08-20 RX ORDER — ESTRADIOL 0.04 MG/D
1 FILM, EXTENDED RELEASE TRANSDERMAL 2 TIMES WEEKLY
Qty: 8 PATCH | Refills: 11 | Status: SHIPPED | OUTPATIENT
Start: 2020-08-20 | End: 2020-09-25 | Stop reason: ALTCHOICE

## 2020-08-20 NOTE — PROGRESS NOTES
Subjective     Chief Complaint   Patient presents with   • Gynecologic Exam     AC, last pap 18 normal pap positive HPV. Colpo 9-10-18 inflammation only.        History of Present Illness    Gia Roberts is a 50 y.o.  who presents for annual exam.  The patient complains of hot flashes.  She notes they are very significant and bothering her quite a bit.  She would like to consider hormone replacement.  Patient has been menopausal since 2019.  She does take 10 mg of Celexa daily but does not think it is doing much.  She is walking for exercise.  Her 3 children are doing well.  One is finishing up at [employer]  Her youngest is started speed school.  Patient does have a history of endometrial ablation.      Obstetric History:  OB History        3    Para   3    Term   3            AB        Living   3       SAB        TAB        Ectopic        Molar        Multiple        Live Births   3          Obstetric Comments   Also one stepchild            Menstrual History:     Patient's last menstrual period was 2019.         Current contraception: vasectomy  History of abnormal Pap smear: yes - Patient had positive HPV x2.  Biopsy in 2018 was negative  Received Gardasil immunization: no  Perform regular self breast exam : yes  Family history of uterine or ovarian cancer: yes - Maternal aunt had ovarian cancer at age 40.  The patient is BRCA negative  Family History of colon cancer: no  Family history of breast cancer: no    Mammogram: ordered.  Colonoscopy: recommended.  DEXA: not indicated.    Exercise: exercises 5 times a week  Calcium/Vitamin D: adequate intake    The following portions of the patient's history were reviewed and updated as appropriate: allergies, current medications, past family history, past medical history, past social history, past surgical history and problem list.    Review of Systems    Review of Systems   Constitutional: Negative for fatigue.   Respiratory:  "Negative for shortness of breath.    Gastrointestinal: Negative for abdominal pain.   Genitourinary: Negative for dysuria.   Neurological: Negative for headaches.   Psychiatric/Behavioral: Negative for dysphoric mood.         Objective   Physical Exam    /80   Ht 167.6 cm (66\")   Wt 84.4 kg (186 lb)   LMP 02/20/2019   Breastfeeding No   BMI 30.02 kg/m²     General:   alert, appears stated age and cooperative   Neck: thyroid normal to palpation   Heart: regular rate and rhythm   Lungs: clear to auscultation bilaterally   Abdomen: soft, non-tender, without masses or organomegaly   Breast: inspection negative, no nipple discharge or bleeding, no masses or nodularity palpable   Vulva: normal, Bartholin's, Urethra, [place]'s normal   Vagina: normal mucosa, normal discharge   Cervix: no cervical motion tenderness and no lesions   Uterus: non-tender, normal shape and consistency   Adnexa: no mass, fullness, tenderness   Rectal: normal rectal, no masses     Assessment/Plan   Gia was seen today for gynecologic exam.    Diagnoses and all orders for this visit:    Menopausal symptoms  -     Ambulatory Referral For Screening Colonoscopy  -     TSH    Encounter for gynecological examination without abnormal finding    Other orders  -     progesterone (PROMETRIUM) 100 MG capsule; Take 1 capsule by mouth Daily.  -     estradiol (VIVELLE-DOT) 0.0375 MG/24HR patch; Place 1 patch on the skin as directed by provider 2 (Two) Times a Week.    We discussed menopause in detail.  We discussed alternative treatments.  I do think first the patient should increase her Celexa to 20 mg daily.  We discussed risk of hormones including increased risk of breast cancer, DVT, heart attack and stroke.  Written information was also given.  Patient will start estrogen patch and progesterone nightly.  She will call with any irregular bleeding.  Colonoscopy and mammogram are ordered.    All questions answered.  Breast self exam technique " reviewed and patient encouraged to perform self-exam monthly.  Discussed healthy lifestyle modifications.  Recommended 30 minutes of aerobic exercise five times per week.  Discussed calcium needs to prevent osteoporosis.

## 2020-08-21 LAB — TSH SERPL DL<=0.005 MIU/L-ACNC: 1.85 UIU/ML (ref 0.27–4.2)

## 2020-08-24 LAB
CYTOLOGIST CVX/VAG CYTO: NORMAL
CYTOLOGY CVX/VAG DOC CYTO: NORMAL
CYTOLOGY CVX/VAG DOC THIN PREP: NORMAL
DX ICD CODE: NORMAL
HIV 1 & 2 AB SER-IMP: NORMAL
HPV I/H RISK 4 DNA CVX QL PROBE+SIG AMP: NEGATIVE
OTHER STN SPEC: NORMAL
STAT OF ADQ CVX/VAG CYTO-IMP: NORMAL

## 2020-08-25 ENCOUNTER — TELEPHONE (OUTPATIENT)
Dept: OBSTETRICS AND GYNECOLOGY | Age: 50
End: 2020-08-25

## 2020-08-25 NOTE — TELEPHONE ENCOUNTER
----- Message from Milo Henderson MD sent at 8/25/2020  8:55 AM EDT -----  Please notify pap is normal.

## 2020-09-25 ENCOUNTER — TELEPHONE (OUTPATIENT)
Dept: OBSTETRICS AND GYNECOLOGY | Age: 50
End: 2020-09-25

## 2020-09-25 RX ORDER — ESTRADIOL 0.03 MG/D
1 FILM, EXTENDED RELEASE TRANSDERMAL 2 TIMES WEEKLY
Qty: 8 PATCH | Refills: 11 | Status: SHIPPED | OUTPATIENT
Start: 2020-09-28

## 2020-09-25 NOTE — TELEPHONE ENCOUNTER
([place] pt) Pt called, pt would like a lower dosage of the estradiol (Vivelle-Dot) 0.0375 MG/24HR patch.  Pt feels patch is too strong.    Pharmacy verified.    [phone]

## 2020-12-09 ENCOUNTER — PROCEDURE VISIT (OUTPATIENT)
Dept: OBSTETRICS AND GYNECOLOGY | Age: 50
End: 2020-12-09

## 2020-12-09 ENCOUNTER — APPOINTMENT (OUTPATIENT)
Dept: WOMENS IMAGING | Facility: HOSPITAL | Age: 50
End: 2020-12-09

## 2020-12-09 DIAGNOSIS — Z12.31 VISIT FOR SCREENING MAMMOGRAM: Primary | ICD-10-CM

## 2020-12-09 PROCEDURE — 77067 SCR MAMMO BI INCL CAD: CPT | Performed by: RADIOLOGY

## 2020-12-09 PROCEDURE — 77067 SCR MAMMO BI INCL CAD: CPT | Performed by: OBSTETRICS & GYNECOLOGY

## 2021-03-30 ENCOUNTER — BULK ORDERING (OUTPATIENT)
Dept: CASE MANAGEMENT | Facility: OTHER | Age: 51
End: 2021-03-30

## 2021-03-30 DIAGNOSIS — Z23 IMMUNIZATION DUE: ICD-10-CM

## 2021-04-02 RX ORDER — CITALOPRAM 20 MG/1
TABLET ORAL
Qty: 90 TABLET | Refills: 2 | Status: SHIPPED | OUTPATIENT
Start: 2021-04-02 | End: 2022-04-12

## 2022-04-12 RX ORDER — CITALOPRAM 20 MG/1
TABLET ORAL
Qty: 90 TABLET | Refills: 2 | Status: SHIPPED | OUTPATIENT
Start: 2022-04-12

## 2023-04-13 RX ORDER — CITALOPRAM 20 MG/1
TABLET ORAL
Qty: 90 TABLET | Refills: 2 | Status: SHIPPED | OUTPATIENT
Start: 2023-04-13

## 2023-11-08 ENCOUNTER — TELEPHONE (OUTPATIENT)
Dept: OBSTETRICS AND GYNECOLOGY | Age: 53
End: 2023-11-08

## 2023-11-08 NOTE — TELEPHONE ENCOUNTER
Provider: DR. CASILLAS    Caller: UNIQUE MCBRIDE    Relationship to Patient: SELF     Pharmacy:     Phone Number: [phone]    Reason for Call: MAMMOGRAM    When was the patient last seen: 08.20.2020  PATIENT IS SCHEDULE AS NEW GYN ON 04.25.2024    PATIENT IS SCHEDULED FOR ANNUAL ON 04.25.24 @ 10 AM WITH DR. CASILLAS AND WOULD LIKE HER MAMMOGRAM SCHEDULED FOR THAT SAME DAY PLEASE    PATIENT CAN BE REACHED .[phone]    THANK YOU

## 2024-01-02 RX ORDER — CITALOPRAM 20 MG/1
TABLET ORAL
Qty: 90 TABLET | Refills: 2 | Status: SHIPPED | OUTPATIENT
Start: 2024-01-02

## 2024-09-23 RX ORDER — CITALOPRAM HYDROBROMIDE 20 MG/1
TABLET ORAL
Qty: 90 TABLET | Refills: 2 | Status: SHIPPED | OUTPATIENT
Start: 2024-09-23